# Patient Record
Sex: MALE | URBAN - METROPOLITAN AREA
[De-identification: names, ages, dates, MRNs, and addresses within clinical notes are randomized per-mention and may not be internally consistent; named-entity substitution may affect disease eponyms.]

---

## 2018-09-26 ENCOUNTER — OFFICE VISIT (OUTPATIENT)
Dept: URBAN - METROPOLITAN AREA CLINIC 40 | Facility: CLINIC | Age: 83
End: 2018-09-26
Payer: COMMERCIAL

## 2018-09-26 DIAGNOSIS — H25.13 AGE-RELATED NUCLEAR CATARACT, BILATERAL: ICD-10-CM

## 2018-09-26 PROCEDURE — 92014 COMPRE OPH EXAM EST PT 1/>: CPT | Performed by: OPTOMETRIST

## 2018-09-26 ASSESSMENT — INTRAOCULAR PRESSURE
OD: 10
OS: 11

## 2018-09-26 NOTE — IMPRESSION/PLAN
Impression: Age-related nuclear cataract, bilateral: H25.13. Plan: Cataracts account for the patient's complaints. Discussed all risks, benefits, procedures and recovery. Patient understands changing glasses will not improve vision. Patient desires to have surgery, recommend phacoemulsification with intraocular lens. Consider toric IOL, SymFony or ReStor IOL.

## 2018-09-26 NOTE — IMPRESSION/PLAN
Impression: Type 2 diabetes mellitus without complications: V95.3. Plan: Diabetes type II: no background retinopathy, no signs of neovascularization or macular edema noted. Discussed ocular and systemic benefits of blood sugar control. Schedule refraction.

## 2018-10-02 ENCOUNTER — OFFICE VISIT (OUTPATIENT)
Dept: URBAN - METROPOLITAN AREA CLINIC 40 | Facility: CLINIC | Age: 83
End: 2018-10-02
Payer: COMMERCIAL

## 2018-10-02 DIAGNOSIS — E11.9 TYPE 2 DIABETES MELLITUS WITHOUT COMPLICATIONS: ICD-10-CM

## 2018-10-02 PROCEDURE — 99213 OFFICE O/P EST LOW 20 MIN: CPT | Performed by: OPHTHALMOLOGY

## 2018-10-02 RX ORDER — DUREZOL 0.5 MG/ML
0.05 % EMULSION OPHTHALMIC
Qty: 5 | Refills: 1 | Status: INACTIVE
Start: 2018-10-02 | End: 2018-10-24

## 2018-10-02 RX ORDER — BESIFLOXACIN 6 MG/ML
0.6 % SUSPENSION OPHTHALMIC
Qty: 5 | Refills: 1 | Status: INACTIVE
Start: 2018-10-02 | End: 2018-10-10

## 2018-10-02 ASSESSMENT — VISUAL ACUITY
OS: 20/400
OD: 20/400

## 2018-10-02 ASSESSMENT — KERATOMETRY
OS: 45.38
OD: 45.13

## 2018-10-02 NOTE — IMPRESSION/PLAN
Impression: Age-related nuclear cataract, bilateral: H25.13. Ramon Mix Visually significant, quality of life issue, could improve with surgery. Plan: Cataracts account for the patient's complaints. Discussed all risks, benefits, alternatives, procedures and recovery. Patient understands changing glasses will not improve vision. Patient desires to have surgery, recommend phacoemulsification with intraocular lens implant OS.   lvl 3 - pt not for premium IOL

## 2018-10-02 NOTE — IMPRESSION/PLAN
Impression: Type 2 diabetes mellitus without complications: Y98.2. Condition: established, stable. Plan: Diabetes type II: no background retinopathy, no signs of neovascularization noted. Discussed ocular and systemic benefits of blood sugar control.

## 2018-10-22 ENCOUNTER — PROCEDURE (OUTPATIENT)
Dept: URBAN - METROPOLITAN AREA CLINIC 40 | Facility: CLINIC | Age: 83
End: 2018-10-22
Payer: COMMERCIAL

## 2018-10-22 PROCEDURE — 76519 ECHO EXAM OF EYE: CPT | Performed by: OPHTHALMOLOGY

## 2018-10-22 ASSESSMENT — PACHYMETRY
OD: 22.83
OS: 22.83
OD: 2.71
OS: 2.69

## 2018-11-06 ENCOUNTER — SURGERY (OUTPATIENT)
Dept: URBAN - METROPOLITAN AREA SURGERY 11 | Facility: SURGERY | Age: 83
End: 2018-11-06
Payer: COMMERCIAL

## 2018-11-06 PROCEDURE — 66984 XCAPSL CTRC RMVL W/O ECP: CPT | Performed by: OPHTHALMOLOGY

## 2018-11-07 ENCOUNTER — POST-OPERATIVE VISIT (OUTPATIENT)
Dept: URBAN - METROPOLITAN AREA CLINIC 40 | Facility: CLINIC | Age: 83
End: 2018-11-07

## 2018-11-07 PROCEDURE — 99024 POSTOP FOLLOW-UP VISIT: CPT | Performed by: OPTOMETRIST

## 2018-11-07 ASSESSMENT — INTRAOCULAR PRESSURE: OD: 11

## 2018-11-13 ENCOUNTER — OFFICE VISIT (OUTPATIENT)
Dept: URBAN - METROPOLITAN AREA CLINIC 40 | Facility: CLINIC | Age: 83
End: 2018-11-13

## 2018-11-13 DIAGNOSIS — H25.11 AGE-RELATED NUCLEAR CATARACT, RIGHT EYE: Primary | ICD-10-CM

## 2018-11-13 DIAGNOSIS — Z96.1 PRESENCE OF INTRAOCULAR LENS: ICD-10-CM

## 2018-11-13 PROCEDURE — 99213 OFFICE O/P EST LOW 20 MIN: CPT | Performed by: OPHTHALMOLOGY

## 2018-11-13 ASSESSMENT — INTRAOCULAR PRESSURE
OD: 11
OS: 11

## 2018-11-13 ASSESSMENT — VISUAL ACUITY: OD: 20/400

## 2018-11-13 NOTE — IMPRESSION/PLAN
Impression: Presence of intraocular lens: Z96.1. OS. Condition: improving. Plan: d/c abx, taper steroid per plan. d/c shield.

## 2018-11-13 NOTE — IMPRESSION/PLAN
Impression: Age-related nuclear cataract, right eye: H25.11. Shea Oliver Visually significant, quality of life issue, could improve with surgery. Plan: Cataracts account for the patient's complaints. Discussed all risks, benefits, alternatives, procedures and recovery. Patient understands changing glasses will not improve vision.   Patient desires to have surgery, recommend phacoemulsification with intraocular lens implant OD.  lvl 2 - pt declines premium IOL

## 2018-11-19 ENCOUNTER — SURGERY (OUTPATIENT)
Dept: URBAN - METROPOLITAN AREA SURGERY 11 | Facility: SURGERY | Age: 83
End: 2018-11-19
Payer: COMMERCIAL

## 2018-11-19 PROCEDURE — 66984 XCAPSL CTRC RMVL W/O ECP: CPT | Performed by: OPHTHALMOLOGY

## 2018-11-20 ENCOUNTER — POST-OPERATIVE VISIT (OUTPATIENT)
Dept: URBAN - METROPOLITAN AREA CLINIC 40 | Facility: CLINIC | Age: 83
End: 2018-11-20

## 2018-11-20 DIAGNOSIS — Z09 ENCNTR FOR F/U EXAM AFT TRTMT FOR COND OTH THAN MALIG NEOPLM: Primary | ICD-10-CM

## 2018-11-20 PROCEDURE — 99024 POSTOP FOLLOW-UP VISIT: CPT | Performed by: OPTOMETRIST

## 2018-11-20 ASSESSMENT — INTRAOCULAR PRESSURE
OD: 18
OS: 18

## 2018-11-28 ENCOUNTER — POST-OPERATIVE VISIT (OUTPATIENT)
Dept: URBAN - METROPOLITAN AREA CLINIC 40 | Facility: CLINIC | Age: 83
End: 2018-11-28

## 2018-11-28 PROCEDURE — 99024 POSTOP FOLLOW-UP VISIT: CPT | Performed by: OPTOMETRIST

## 2018-11-28 ASSESSMENT — INTRAOCULAR PRESSURE
OD: 12
OS: 12

## 2018-12-19 ENCOUNTER — POST-OPERATIVE VISIT (OUTPATIENT)
Dept: URBAN - METROPOLITAN AREA CLINIC 40 | Facility: CLINIC | Age: 83
End: 2018-12-19

## 2018-12-19 PROCEDURE — 99024 POSTOP FOLLOW-UP VISIT: CPT | Performed by: OPTOMETRIST

## 2018-12-19 ASSESSMENT — INTRAOCULAR PRESSURE
OS: 16
OD: 17

## 2019-11-18 ENCOUNTER — OFFICE VISIT (OUTPATIENT)
Dept: URBAN - METROPOLITAN AREA CLINIC 40 | Facility: CLINIC | Age: 84
End: 2019-11-18
Payer: COMMERCIAL

## 2019-11-18 PROCEDURE — 92014 COMPRE OPH EXAM EST PT 1/>: CPT | Performed by: OPTOMETRIST

## 2019-11-18 ASSESSMENT — INTRAOCULAR PRESSURE
OS: 12
OD: 13

## 2019-11-18 ASSESSMENT — KERATOMETRY
OD: 45.00
OS: 45.25

## 2019-11-18 NOTE — IMPRESSION/PLAN
Impression: Type 2 diabetes mellitus w/o complication: Q03.7. Plan: Diabetes type II: no background retinopathy, no signs of neovascularization or macular edema noted. Discussed ocular and systemic benefits of blood sugar control. Schedule refraction.

## 2021-01-05 ENCOUNTER — OFFICE VISIT (OUTPATIENT)
Dept: URBAN - METROPOLITAN AREA CLINIC 40 | Facility: CLINIC | Age: 86
End: 2021-01-05
Payer: COMMERCIAL

## 2021-01-05 DIAGNOSIS — E11.3292 TYPE 2 DIAB W MILD NONPRLF DIABETIC RTNOP W/O MACULAR EDEMA, LEFT EYE: Primary | ICD-10-CM

## 2021-01-05 PROCEDURE — 92014 COMPRE OPH EXAM EST PT 1/>: CPT | Performed by: OPTOMETRIST

## 2021-01-05 ASSESSMENT — INTRAOCULAR PRESSURE
OS: 10
OD: 10

## 2021-01-05 NOTE — IMPRESSION/PLAN
Impression: Type 2 diab w mild nonprlf diabetic rtnop w/o macular edema, left eye: R62.8646. Plan: Diabetes type II: mild background diabetic retinopathy, no signs of neovascularization noted. No treatment necessary at this time. Patient was instructed to monitor vision for sudden changes and to call if visual changes noted. Discussed ocular and systemic benefits of blood sugar control. Schedule refraction.

## 2022-01-07 ENCOUNTER — OFFICE VISIT (OUTPATIENT)
Dept: URBAN - METROPOLITAN AREA CLINIC 40 | Facility: CLINIC | Age: 87
End: 2022-01-07
Payer: COMMERCIAL

## 2022-01-07 DIAGNOSIS — E11.3293 TYPE 2 DIAB W MILD NONPRLF DIABETIC RTNOP W/O MACULAR EDEMA, BILATERAL: Primary | ICD-10-CM

## 2022-01-07 DIAGNOSIS — H26.493 OTHER SECONDARY CATARACT, BILATERAL: ICD-10-CM

## 2022-01-07 PROCEDURE — 99214 OFFICE O/P EST MOD 30 MIN: CPT | Performed by: OPTOMETRIST

## 2022-01-07 ASSESSMENT — INTRAOCULAR PRESSURE
OS: 10
OD: 10

## 2022-01-07 ASSESSMENT — KERATOMETRY
OS: 45.50
OD: 45.38

## 2022-01-07 NOTE — IMPRESSION/PLAN
Impression: Type 2 diab w mild nonprlf diabetic rtnop w/o macular edema, bilateral: J55.3085. Plan: Diabetes type II: moderate background diabetic retinopathy, no signs of neovascularization noted. Will refer patient for a retinal consult to determine if treatment is necessary. Discussed ocular and systemic benefits of maintaining blood sugar control.